# Patient Record
Sex: MALE | Race: WHITE | NOT HISPANIC OR LATINO
[De-identification: names, ages, dates, MRNs, and addresses within clinical notes are randomized per-mention and may not be internally consistent; named-entity substitution may affect disease eponyms.]

---
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
## 2019-02-21 ENCOUNTER — EMERGENCY (EMERGENCY)
Facility: HOSPITAL | Age: 19
LOS: 1 days | Discharge: HOME | End: 2019-02-21
Attending: EMERGENCY MEDICINE | Admitting: EMERGENCY MEDICINE
Payer: COMMERCIAL

## 2019-02-21 VITALS
HEART RATE: 94 BPM | SYSTOLIC BLOOD PRESSURE: 134 MMHG | TEMPERATURE: 98 F | RESPIRATION RATE: 18 BRPM | OXYGEN SATURATION: 100 % | DIASTOLIC BLOOD PRESSURE: 80 MMHG

## 2019-02-21 VITALS — WEIGHT: 145.06 LBS | HEIGHT: 74 IN

## 2019-02-21 DIAGNOSIS — N50.812 LEFT TESTICULAR PAIN: ICD-10-CM

## 2019-02-21 LAB
APPEARANCE UR: CLEAR — SIGNIFICANT CHANGE UP
BILIRUB UR-MCNC: NEGATIVE — SIGNIFICANT CHANGE UP
COLOR SPEC: YELLOW — SIGNIFICANT CHANGE UP
DIFF PNL FLD: NEGATIVE — SIGNIFICANT CHANGE UP
GLUCOSE UR QL: NEGATIVE MG/DL — SIGNIFICANT CHANGE UP
KETONES UR-MCNC: NEGATIVE — SIGNIFICANT CHANGE UP
LEUKOCYTE ESTERASE UR-ACNC: NEGATIVE — SIGNIFICANT CHANGE UP
NITRITE UR-MCNC: NEGATIVE — SIGNIFICANT CHANGE UP
PH UR: 6 — SIGNIFICANT CHANGE UP (ref 5–8)
PROT UR-MCNC: NEGATIVE MG/DL — SIGNIFICANT CHANGE UP
SP GR SPEC: 1.02 — SIGNIFICANT CHANGE UP (ref 1.01–1.02)
UROBILINOGEN FLD QL: NEGATIVE MG/DL — SIGNIFICANT CHANGE UP

## 2019-02-21 PROCEDURE — 76870 US EXAM SCROTUM: CPT | Mod: 26

## 2019-02-21 PROCEDURE — 99284 EMERGENCY DEPT VISIT MOD MDM: CPT

## 2019-02-21 RX ORDER — IBUPROFEN 200 MG
600 TABLET ORAL ONCE
Qty: 0 | Refills: 0 | Status: COMPLETED | OUTPATIENT
Start: 2019-02-21 | End: 2019-02-21

## 2019-02-21 RX ADMIN — Medication 600 MILLIGRAM(S): at 20:43

## 2019-02-21 NOTE — ED STATDOCS - ATTENDING CONTRIBUTION TO CARE
Attending Contribution to Care: I, Rosy Ramirez, performed the initial face to face bedside interview with this patient regarding history of present illness, review of symptoms and relevant past medical, social and family history.  I completed an independent physical examination.  I was the initial provider who evaluated this patient. I have signed out the follow up of any pending tests (i.e. labs, radiological studies) to the ACP.  I have communicated the patient’s plan of care and disposition with the ACP.

## 2019-02-21 NOTE — ED ADULT NURSE NOTE - NSIMPLEMENTINTERV_GEN_ALL_ED
Implemented All Universal Safety Interventions:  [place] to call system. Call bell, personal items and telephone within reach. Instruct patient to call for assistance. Room bathroom lighting operational. Non-slip footwear when patient is off stretcher. Physically safe environment: no spills, clutter or unnecessary equipment. Stretcher in lowest position, wheels locked, appropriate side rails in place.

## 2019-02-21 NOTE — ED STATDOCS - OBJECTIVE STATEMENT
19 y/o m presenting to the ED c/o left testicular pain/swelling since 5am yesterday. Pt states he masturbated at 5am yesterday morning, then urinated, pain began shortly after. Pain described as dull, aching. +reports occasional burning urination. Denies fever, chills, discharge, hematuria. No recent injury or fall. Last sexual intercourse was 2 months ago with STD negative individual. Took Aleve this morning with little relief to sx. No hx of similar sx in the past.

## 2019-02-21 NOTE — ED ADULT TRIAGE NOTE - CHIEF COMPLAINT QUOTE
lt testicular pain with swelling since 5am yesterday +burning  sensation  denies discharge and fever no inj

## 2019-02-21 NOTE — ED STATDOCS - PROGRESS NOTE DETAILS
Patient initially seen and evaluated with ED attending at intake.  Testicular exam performed with Dr. Ramirez at bedside was unremarkable, +cremasteric reflex.  Urine and sono negative here.  Cultures and gc/chlamydia were sent from [facility] that he will follow up with.  Recommended urology follow up -Aj Gallegos PA-C

## 2019-02-22 RX ORDER — DOCUSATE SODIUM 100 MG
1 CAPSULE ORAL
Qty: 0 | Refills: 0 | COMMUNITY

## 2019-02-23 ENCOUNTER — TRANSCRIPTION ENCOUNTER (OUTPATIENT)
Age: 19
End: 2019-02-23

## 2019-03-05 ENCOUNTER — APPOINTMENT (OUTPATIENT)
Dept: UROLOGY | Facility: CLINIC | Age: 19
End: 2019-03-05
Payer: COMMERCIAL

## 2019-03-05 VITALS
SYSTOLIC BLOOD PRESSURE: 122 MMHG | DIASTOLIC BLOOD PRESSURE: 85 MMHG | BODY MASS INDEX: 19.64 KG/M2 | WEIGHT: 145 LBS | TEMPERATURE: 97.6 F | HEIGHT: 72 IN | OXYGEN SATURATION: 98 % | HEART RATE: 64 BPM

## 2019-03-05 DIAGNOSIS — Z78.9 OTHER SPECIFIED HEALTH STATUS: ICD-10-CM

## 2019-03-05 PROCEDURE — 99204 OFFICE O/P NEW MOD 45 MIN: CPT

## 2019-03-05 NOTE — REVIEW OF SYSTEMS
[Eyesight Problems] : eyesight problems [Strain or push to urinate] : strain or push to urinate [Interrupted urine stream] : interrupted urine stream [see HPI] : see HPI [Anxiety] : anxiety [Depression] : depression [Negative] : Heme/Lymph

## 2019-03-07 NOTE — PHYSICAL EXAM
[General Appearance - Well Developed] : well developed [General Appearance - Well Nourished] : well nourished [Normal Appearance] : normal appearance [Well Groomed] : well groomed [General Appearance - In No Acute Distress] : no acute distress [Edema] : no peripheral edema [Respiration, Rhythm And Depth] : normal respiratory rhythm and effort [Exaggerated Use Of Accessory Muscles For Inspiration] : no accessory muscle use [Abdomen Soft] : soft [Abdomen Tenderness] : non-tender [Abdomen Hernia] : no hernia was discovered [Costovertebral Angle Tenderness] : no ~M costovertebral angle tenderness [Urethral Meatus] : meatus normal [Penis Abnormality] : normal circumcised penis [Scrotum] : the scrotum was normal [Epididymis] : the epididymides were normal [Testes Tenderness] : no tenderness of the testes [Testes Mass (___cm)] : there were no testicular masses [Normal Station and Gait] : the gait and station were normal for the patient's age [Skin Color & Pigmentation] : normal skin color and pigmentation [Skin Turgor] : supple [] : no rash [No Focal Deficits] : no focal deficits [Sensation] : the sensory exam was normal to light touch and pinprick [Oriented To Time, Place, And Person] : oriented to person, place, and time [Affect] : the affect was normal [Mood] : the mood was normal [Not Anxious] : not anxious [No Palpable Adenopathy] : no palpable adenopathy [Urinary Bladder Findings] : the bladder was normal on palpation

## 2019-03-07 NOTE — ASSESSMENT
[FreeTextEntry1] : 17 yo M with LEFT orchalgia. His left testicular pain has resolved at this time with NSAIDS. \par He will RTO PRN return of symptoms.

## 2019-03-07 NOTE — HISTORY OF PRESENT ILLNESS
[FreeTextEntry1] : Mr Ku is a 19 yo Male that presented 3/5/19 for evaluation of left testicular pain.  He was recently seen at [facility] where Scrotal US showed no evidence of torsion. He was discharged with diagnosis of epididymitis and had been taking ibuprofen 200 mg Q 4 hours for 4 days. His Pain began weeks ago, and was located in the left testicle and was a mild dull constant ache that was alleviated by NSAID use.   At this time he is pain free and has been for 4 days.  He is currently sexually active with 2 partners and uses condoms most of the time.  He has STI testing done which was negative when he was seen at [facility].

## 2019-04-11 ENCOUNTER — APPOINTMENT (OUTPATIENT)
Age: 19
End: 2019-04-11
Payer: MEDICAID

## 2019-04-11 DIAGNOSIS — N34.1 NONSPECIFIC URETHRITIS: ICD-10-CM

## 2019-04-11 PROCEDURE — 96372 THER/PROPH/DIAG INJ SC/IM: CPT

## 2019-04-11 PROCEDURE — 99213 OFFICE O/P EST LOW 20 MIN: CPT | Mod: 25

## 2019-04-11 NOTE — ASSESSMENT
[FreeTextEntry1] : 19 yo M with symptoms and exam findings concerning for infectious urethritis. 350 mg of Ceftriaxone administered IM. Rx given for Azithromycin 1000 mg x1 dose. Full STD panel ordered. Pt will follow up 1-2 weeks for symptom check.

## 2019-04-11 NOTE — HISTORY OF PRESENT ILLNESS
[FreeTextEntry1] : Mr Ku is a 19 yo Male that presented 3/5/19 for evaluation of left testicular pain.  He was recently seen at [facility] where he had a torsion ruled out and was diagnosed with epididymitis and had been taking ibuprofen 200 mg Q 4 hours for 4 days. His Pain was located in the left testicle and was a dull constant ache that was alleviated by NSAID use.   At this time he is pain free and has been for 4 days.  He is currently sexually active with 2 partners and uses condoms most of the time.  He has STI testing done which was negative when he was seen at [facility]. \par \par 04/11/2019: Patient presents for follow up. He reports no further scrotal pain, but now has burning with urination and urethral discharge. He reports recent unprotected sex. No hematuria, no frequency, no urgency, no hesitancy, no straining. No incontinence. No fevers, no chills, no nausea, no vomiting, no flank pain.

## 2019-04-11 NOTE — PHYSICAL EXAM
[General Appearance - Well Developed] : well developed [Normal Appearance] : normal appearance [General Appearance - Well Nourished] : well nourished [Well Groomed] : well groomed [General Appearance - In No Acute Distress] : no acute distress [Abdomen Soft] : soft [Abdomen Tenderness] : non-tender [Abdomen Hernia] : no hernia was discovered [Costovertebral Angle Tenderness] : no ~M costovertebral angle tenderness [Urethral Meatus] : meatus normal [Penis Abnormality] : normal circumcised penis [Urinary Bladder Findings] : the bladder was normal on palpation [Scrotum] : the scrotum was normal [Epididymis] : the epididymides were normal [Testes Tenderness] : no tenderness of the testes [Testes Mass (___cm)] : there were no testicular masses [FreeTextEntry1] : tender bulbar urethra [Skin Color & Pigmentation] : normal skin color and pigmentation [Skin Turgor] : supple [Edema] : no peripheral edema [Exaggerated Use Of Accessory Muscles For Inspiration] : no accessory muscle use [Respiration, Rhythm And Depth] : normal respiratory rhythm and effort [] : no respiratory distress [Affect] : the affect was normal [Oriented To Time, Place, And Person] : oriented to person, place, and time [Not Anxious] : not anxious [Mood] : the mood was normal [No Focal Deficits] : no focal deficits [Normal Station and Gait] : the gait and station were normal for the patient's age [No Palpable Adenopathy] : no palpable adenopathy [Sensation] : the sensory exam was normal to light touch and pinprick

## 2019-04-11 NOTE — REVIEW OF SYSTEMS
[Eyesight Problems] : eyesight problems [Strain or push to urinate] : strain or push to urinate [Interrupted urine stream] : interrupted urine stream [see HPI] : see HPI [Depression] : depression [Anxiety] : anxiety [Negative] : Endocrine

## 2019-04-12 LAB
APPEARANCE: CLEAR
BACTERIA: NEGATIVE
BILIRUBIN URINE: NEGATIVE
BLOOD URINE: NEGATIVE
COLOR: NORMAL
GLUCOSE QUALITATIVE U: NEGATIVE
HYALINE CASTS: 0 /LPF
KETONES URINE: NEGATIVE
LEUKOCYTE ESTERASE URINE: NEGATIVE
MICROSCOPIC-UA: NORMAL
NITRITE URINE: NEGATIVE
PH URINE: 6
PROTEIN URINE: NEGATIVE
RED BLOOD CELLS URINE: 2 /HPF
SPECIFIC GRAVITY URINE: 1.02
SQUAMOUS EPITHELIAL CELLS: 0 /HPF
UROBILINOGEN URINE: NORMAL
WHITE BLOOD CELLS URINE: 4 /HPF

## 2019-04-15 LAB
BACTERIA UR CULT: NORMAL
C TRACH RRNA SPEC QL NAA+PROBE: DETECTED
N GONORRHOEA RRNA SPEC QL NAA+PROBE: NOT DETECTED
SOURCE AMPLIFICATION: NORMAL

## 2019-04-25 ENCOUNTER — APPOINTMENT (OUTPATIENT)
Dept: UROLOGY | Facility: CLINIC | Age: 19
End: 2019-04-25

## 2019-04-30 ENCOUNTER — APPOINTMENT (OUTPATIENT)
Dept: UROLOGY | Facility: CLINIC | Age: 19
End: 2019-04-30
Payer: COMMERCIAL

## 2019-04-30 PROCEDURE — 99213 OFFICE O/P EST LOW 20 MIN: CPT

## 2019-04-30 RX ORDER — AZITHROMYCIN 500 MG/1
500 TABLET, FILM COATED ORAL
Qty: 2 | Refills: 0 | Status: COMPLETED | COMMUNITY
Start: 2019-04-11 | End: 2019-04-30

## 2019-04-30 NOTE — ASSESSMENT
[FreeTextEntry1] : 19 yo M with chlamydia urethritis. He was treated appropriately last visit with 350 mg of Ceftriaxone administered IM, Azithromycin 1000 mg x1 dose. Full STD panel still pending. Will repeat urine assay to confirm resolution given perisstence of mild urinary discomfort.

## 2019-04-30 NOTE — HISTORY OF PRESENT ILLNESS
[FreeTextEntry1] : Mr Ku is a 19 yo Male that presented 3/5/19 for evaluation of left testicular pain.  He was recently seen at [facility] where he had a torsion ruled out and was diagnosed with epididymitis and had been taking ibuprofen 200 mg Q 4 hours for 4 days. His Pain was located in the left testicle and was a dull constant ache that was alleviated by NSAID use.   At this time he is pain free and has been for 4 days.  He is currently sexually active with 2 partners and uses condoms most of the time.  He has STI testing done which was negative when he was seen at [facility]. \par \par 04/11/2019: Patient presents for follow up. He reports no further scrotal pain, but now has burning with urination and urethral discharge. He reports recent unprotected sex. No hematuria, no frequency, no urgency, no hesitancy, no straining. No incontinence. No fevers, no chills, no nausea, no vomiting, no flank pain. \par \par 04/30/2019: Patient presents for follow up. His GC/CT assay was positive for chlamydia. He was given Ceftriaxone IM and Azythromycin 1000 mg.  He reports dysuria has resolved. Still some mild discomfort with voiding, but much improved.  No hematuria, no frequency, no urgency, no hesitancy, no straining. No incontinence. No fevers, no chills, no nausea, no vomiting, no flank pain.

## 2019-04-30 NOTE — DISCUSSION/SUMMARY
[FreeTextEntry1] : Called patient to discuss the recent lab results: urine sample + chlamydia. Pt. verbalizes an understanding of the disease and states that he finished his antibiotics - azithromycin as ordered. Ceftriaxone 350mg IM x 1 given in office at time of appointment. Pt. scheduled to RTO next Thursday. Pt. instructed to follow practices of safe sex and to use condoms. Dr. Noguera made aware of the results today.

## 2019-04-30 NOTE — PHYSICAL EXAM
[General Appearance - Well Developed] : well developed [General Appearance - Well Nourished] : well nourished [Normal Appearance] : normal appearance [Well Groomed] : well groomed [General Appearance - In No Acute Distress] : no acute distress [Abdomen Soft] : soft [Abdomen Tenderness] : non-tender [Abdomen Hernia] : no hernia was discovered [Costovertebral Angle Tenderness] : no ~M costovertebral angle tenderness [Urethral Meatus] : meatus normal [Penis Abnormality] : normal circumcised penis [Urinary Bladder Findings] : the bladder was normal on palpation [Scrotum] : the scrotum was normal [Epididymis] : the epididymides were normal [Testes Tenderness] : no tenderness of the testes [Testes Mass (___cm)] : there were no testicular masses [Skin Color & Pigmentation] : normal skin color and pigmentation [Skin Turgor] : supple [Edema] : no peripheral edema [] : no respiratory distress [Respiration, Rhythm And Depth] : normal respiratory rhythm and effort [Exaggerated Use Of Accessory Muscles For Inspiration] : no accessory muscle use [Oriented To Time, Place, And Person] : oriented to person, place, and time [Affect] : the affect was normal [Mood] : the mood was normal [Not Anxious] : not anxious [Normal Station and Gait] : the gait and station were normal for the patient's age [No Focal Deficits] : no focal deficits [Sensation] : the sensory exam was normal to light touch and pinprick [No Palpable Adenopathy] : no palpable adenopathy [FreeTextEntry1] : tender bulbar urethra

## 2019-05-06 LAB
C TRACH RRNA SPEC QL NAA+PROBE: DETECTED
N GONORRHOEA RRNA SPEC QL NAA+PROBE: NOT DETECTED
SOURCE AMPLIFICATION: NORMAL

## 2019-05-19 ENCOUNTER — TRANSCRIPTION ENCOUNTER (OUTPATIENT)
Age: 19
End: 2019-05-19

## 2019-05-24 ENCOUNTER — APPOINTMENT (OUTPATIENT)
Dept: INTERNAL MEDICINE | Facility: CLINIC | Age: 19
End: 2019-05-24
Payer: COMMERCIAL

## 2019-05-24 VITALS
HEIGHT: 72 IN | SYSTOLIC BLOOD PRESSURE: 104 MMHG | WEIGHT: 145 LBS | BODY MASS INDEX: 19.64 KG/M2 | TEMPERATURE: 98.1 F | HEART RATE: 85 BPM | DIASTOLIC BLOOD PRESSURE: 72 MMHG | OXYGEN SATURATION: 98 % | RESPIRATION RATE: 18 BRPM

## 2019-05-24 DIAGNOSIS — B27.90 INFECTIOUS MONONUCLEOSIS, UNSPECIFIED W/OUT COMPLICATION: ICD-10-CM

## 2019-05-24 PROCEDURE — 99204 OFFICE O/P NEW MOD 45 MIN: CPT

## 2019-05-24 NOTE — HEALTH RISK ASSESSMENT
[Employed] : employed [Smoke Detector] : smoke detector [Carbon Monoxide Detector] : carbon monoxide detector [Safety elements used in home] : safety elements used in home [Seat Belt] :  uses seat belt [Sunscreen] : uses sunscreen [0] : 2) Feeling down, depressed, or hopeless: Not at all (0) [] : No [de-identified] : marijuana [[id number]] : 0 [Guns at Home] : no guns at home [FreeTextEntry2] : palak smith

## 2019-05-24 NOTE — HISTORY OF PRESENT ILLNESS
[FreeTextEntry1] : Patient comes in to establish care.\par  [de-identified] : Patient is a 17 y/o M comes in to [facility] care. Was recently seen on 5/19 at Urgent care with complaints of sore throat, with monospot test positive. Today feeling much better and states he needs a return to work date/letter. \par He was recently seen by Urology and diagnosed with chlamydia, treated with rocephin 250mg IM x1 and azithromycin 1000 mg x1. Test of cure results are positive, pt was not aware of these results. Per patient no new sexual partners since last treatment. \par Patient denies any cp, sob,abdominal pain, nausea, vomiting, palpitations, fever, chills, constipation, diarrhea.\par

## 2019-05-30 NOTE — H&P ADULT - NSHPPHYSICALEXAM_GEN_ALL_CORE
Visit Note Report:  Piyush is a pleasant 62-year-old male with multiple medical comorbidities including a history of chronic kidney disease with kidney transplant. He is here today with complaints of bilateral shoulder pain. The right shoulder pain is nearly completely resolved. He continues to have some left shoulder pain that has also improved over the past month. A month ago he was a golf clubs and had immediate onset of pain in the lateral aspect of his left shoulder in the region of the deltoid where he points to locate his symptoms. He has been avoiding golf and has been using ice and heat and working on exercises and states that he has experienced substantial improvement in his symptoms since the initial injury but he wanted to make sure that there was not any need for imaging studies or a problem that needed to be seen and addressed sooner rather than later. He is able to reach his left arm overhead. He has not gone back to golfing at this point but wonders what would be an appropriate time to return to golfing.    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 Murillo   • Iridotomy/iridectomy by laser Bilateral 5-    Laser, Iridotomy   • Iridotomy/iridectomy by laser Bilateral 5-22-15    Laser, Iridotomy-repeat   • Removal of hydrocele,tunica,bilat      R. side   • Remv foreign body,knee/thigh,deep      foreign body from medical misadventure-removed from right leg   • Transplant kidney+recip nephrec  1986    [place]     Social History     Socioeconomic History   • Marital status: /Civil Union     Spouse name: Cindi   • Number of children: 0   • Years of education: Not on file   • Highest education level: Not on file   Occupational History     Employer: [employer]   Social Needs   • Financial resource strain: Not on file   • Food insecurity:     Worry: Not on file     Inability: Not on file   • Transportation needs:     Medical: Not on file     Non-medical: Not on file   Tobacco Use   • Smoking status: Former Smoker     Types: Cigars     Last attempt to quit: 2010     Years since quittin.4   • Smokeless tobacco: Never Used   Substance and Sexual Activity   • Alcohol use: Yes     Alcohol/week: 0.0 oz     Comment: Occ   • Drug use: No   • Sexual activity: Yes     Partners: Female   Lifestyle   • Physical activity:     Days per week: Not on file     Minutes per session: Not on file   • Stress: Not on file   Relationships   • Social connections:     Talks on phone: Not on file     Gets together: Not on file     Attends Orthodox service: Not on file     Active member of club or organization: Not on file     Attends meetings of clubs or organizations: Not on file     Relationship status: Not on file   • Intimate partner violence:     Fear of current or ex partner: Not on  file     Emotionally abused: Not on file     Physically abused: Not on file     Forced sexual activity: Not on file   Other Topics Concern   • Not on file   Social History Narrative    HUAN, low cholesterol diet     Family History   Problem Relation Age of Onset   • Cancer Mother         lung   • Cancer Father         mesothelioma     ALLERGIES:   Allergen Reactions   • Penicillins RASH     Current Outpatient Medications   Medication Sig Dispense Refill   • lisinopril (ZESTRIL) 20 MG tablet TAKE ONE TABLET BY MOUTH ONE TIME DAILY 30 tablet 11   • pravastatin (PRAVACHOL) 20 MG tablet TAKE ONE TABLET BY MOUTH NIGHTLY AT BEDTIME 90 tablet 3   • FLUZONE QUADRIVALENT 0.5 ML injection TO BE ADMINISTERED BY PHARMACIST FOR IMMUNIZATION  0   • REVLIMID 10 MG capsule TAKE ONE CAPSULE BY MOUTH EVERY OTHER DAY FOR 21 DAYS THEN 7 DAYS OFF  0   • linezolid (ZYVOX) 600 MG tablet Take 600 mg by mouth.     • ranitidine (ZANTAC) 150 MG tablet Take 150 mg by mouth.     • vitamin - therapeutic multivitamins w/minerals (CENTRUM SILVER,THERA-M) Tab Take 1 tablet by mouth daily.     • aspirin 81 MG tablet Take 81 mg by mouth 2 times daily.     • atenolol (TENORMIN) 100 MG tablet Take  by mouth.     • PREDNISONE PO Take 7.5 mg by mouth daily. Indications: Kidney Transplant     • albuterol 108 (90 BASE) MCG/ACT inhaler Inhale 2 puffs into the lungs 2 times daily. And as needed for wheeze or shortness of breath 1 Inhaler 5   • CycloSPORINE Modified 100 MG OR CAPS TAKE ONE CAPSULE BY MOUTH ONE TIME DAILY FOR KIDNEY TRANSPLANT 30 2   • Mycophenolate Mofetil (CELLCEPT) 250 MG OR CAPS 3 bid for kidney transplant  V42.0 540 1     No current facility-administered medications for this visit.        I have reviewed the patient's medications and allergies, past medical, surgical, social and family history, updating these as appropriate.  See Histories section of the EMR for a display of this information.    PHYSICAL EXAM:  Bilateral upper extremities  were examined for bilateral comparison and pertinent positives are listed below.  General: Patient is awake, alert and in no acute distress  Psychiatric:  Speech and behavior are appropriate.  Inspection: Inspection demonstrates no erythema, rashes, open wounds or evidence of underlying infection. No obvious muscle atrophy..  Palpation: Minimal tenderness to palpation lateral left shoulder  ROM: Patient is able actively elevate the left shoulder 180°, external rotation is 70° internal rotation 50°  Strength Testing: Patient exhibits 5/5 bilateral shoulder internal and external rotation abduction, and flexion strength bilaterally symmetrical with pain reproduced with resisted external rotation on the left  Vascular: 2+ distal pulses  Neuro: Sensation is intact to light touch bilateral lower extremities  Special Tests: Empty can test is negative for weakness or pain reproduction on the left    Assessment:  Resolving left shoulder rotator cuff strain    Plan:  I reassured the patient that based on the strength that he is able to exhibit on his physical exam today I think it very unlikely that he has a torn rotator cuff and that his symptoms are likely related to a resolving left shoulder rotator cuff strain. I encouraged him to continue to do exactly what he has been doing up to this point with activity modification, ice and/or heat, and exercises and may return to golf as long his golf does not seem to aggravate his symptoms and should do so at a slow pace. The patient was in agreement with this plan will follow-up with me if his symptoms worsen.     ICU Vital Signs Last 24 Hrs  T(C): 37 (08 Feb 2018 11:12), Max: 37 (08 Feb 2018 11:12)  T(F): 98.6 (08 Feb 2018 11:12), Max: 98.6 (08 Feb 2018 11:12)  HR: 59 (08 Feb 2018 11:12) (59 - 62)  BP: 120/69 (08 Feb 2018 11:12) (118/64 - 120/69)  BP(mean): --  ABP: --  ABP(mean): --  RR: 18 (08 Feb 2018 11:12) (16 - 18)  SpO2: 100% (08 Feb 2018 11:12) (100% - 100%)    PHYSICAL EXAM:  Constitutional: NAD  Eyes:  WNL  ENMT:  WNL  Neck:  WNL, non tender  Back: Non tender  Respiratory: CTABL  Cardiovascular:  S1+S2+0  Gastrointestinal: Soft, ND, RLQ tenderness, rebound and guarding.  Genitourinary:  WNL  Extremities: NV intact  Vascular:  Intact  Neurological: No focal neurological deficit,  CN, motor and sensory system grossly intact.  Skin: WNL  Musculoskeletal: WNL  Psychiatric: Grossly WNL

## 2019-06-26 ENCOUNTER — APPOINTMENT (OUTPATIENT)
Dept: INTERNAL MEDICINE | Facility: CLINIC | Age: 19
End: 2019-06-26
Payer: COMMERCIAL

## 2019-06-26 VITALS
TEMPERATURE: 97.5 F | WEIGHT: 145 LBS | OXYGEN SATURATION: 97 % | DIASTOLIC BLOOD PRESSURE: 78 MMHG | HEART RATE: 64 BPM | SYSTOLIC BLOOD PRESSURE: 104 MMHG | BODY MASS INDEX: 19.64 KG/M2 | HEIGHT: 72 IN | RESPIRATION RATE: 18 BRPM

## 2019-06-26 DIAGNOSIS — A56.2 CHLAMYDIAL INFECTION OF GENITOURINARY TRACT, UNSPECIFIED: ICD-10-CM

## 2019-06-26 PROCEDURE — 99213 OFFICE O/P EST LOW 20 MIN: CPT

## 2019-06-26 NOTE — HISTORY OF PRESENT ILLNESS
[FreeTextEntry1] : SHAYNE DONTE is a 18 year M who comes in for a follow up visit.\par  [de-identified] : Patient is a 18-year-old male history of infectious mononucleosis and chlamydia. Patient states that he did take his doxycycline however he went on vacation and forgot his medication at home. He had 3 doses of his doxycycline left. He took the remaining dosages once he returned from vacation. He is still having symptoms including spontaneous pain at the penis, and incomplete bladder emptying sensation. He has had some on and off dysuria as well. No penile discharge, rash or lesions. He is sexually active and is using protection 100% of the time.\par Patient denies any cp, sob,abdominal pain, nausea, vomiting, palpitations, fever, chills, constipation, diarrhea.\par

## 2019-06-26 NOTE — ASSESSMENT
[FreeTextEntry1] : 1.Chlamydia trachomatis infection: pt retreated with doxycycline, however, he skipped 4-5 days in between as he went on vacation and completed course once he returned. Still with some vague symptoms. Will retest urine for GC/C. Patient counseled on safe sex practices.

## 2019-06-27 LAB
C TRACH RRNA SPEC QL NAA+PROBE: NOT DETECTED
N GONORRHOEA RRNA SPEC QL NAA+PROBE: NOT DETECTED
SOURCE AMPLIFICATION: NORMAL

## 2019-07-22 ENCOUNTER — APPOINTMENT (OUTPATIENT)
Dept: PSYCHIATRY | Facility: CLINIC | Age: 19
End: 2019-07-22
Payer: COMMERCIAL

## 2019-07-22 PROCEDURE — 99205 OFFICE O/P NEW HI 60 MIN: CPT

## 2019-07-22 RX ORDER — DOXYCYCLINE 100 MG/1
100 CAPSULE ORAL
Qty: 14 | Refills: 0 | Status: DISCONTINUED | COMMUNITY
Start: 2019-05-24 | End: 2019-07-22

## 2019-08-14 ENCOUNTER — APPOINTMENT (OUTPATIENT)
Dept: PSYCHIATRY | Facility: CLINIC | Age: 19
End: 2019-08-14
Payer: COMMERCIAL

## 2019-08-14 PROCEDURE — 99214 OFFICE O/P EST MOD 30 MIN: CPT

## 2019-08-30 ENCOUNTER — TRANSCRIPTION ENCOUNTER (OUTPATIENT)
Age: 19
End: 2019-08-30

## 2019-09-09 ENCOUNTER — APPOINTMENT (OUTPATIENT)
Dept: PSYCHIATRY | Facility: CLINIC | Age: 19
End: 2019-09-09
Payer: COMMERCIAL

## 2019-09-09 PROCEDURE — 99214 OFFICE O/P EST MOD 30 MIN: CPT

## 2019-09-09 RX ORDER — CITALOPRAM HYDROBROMIDE 40 MG/1
40 TABLET, FILM COATED ORAL DAILY
Qty: 30 | Refills: 0 | Status: DISCONTINUED | COMMUNITY
Start: 2019-07-22 | End: 2019-09-09

## 2019-10-07 ENCOUNTER — APPOINTMENT (OUTPATIENT)
Dept: PSYCHIATRY | Facility: CLINIC | Age: 19
End: 2019-10-07
Payer: COMMERCIAL

## 2019-10-07 PROCEDURE — 99214 OFFICE O/P EST MOD 30 MIN: CPT

## 2020-01-06 ENCOUNTER — APPOINTMENT (OUTPATIENT)
Dept: PSYCHIATRY | Facility: CLINIC | Age: 20
End: 2020-01-06
Payer: COMMERCIAL

## 2020-01-06 PROCEDURE — 99214 OFFICE O/P EST MOD 30 MIN: CPT

## 2020-01-14 ENCOUNTER — APPOINTMENT (OUTPATIENT)
Dept: UROLOGY | Facility: CLINIC | Age: 20
End: 2020-01-14
Payer: COMMERCIAL

## 2020-01-14 DIAGNOSIS — N50.812 LEFT TESTICULAR PAIN: ICD-10-CM

## 2020-01-14 PROCEDURE — 99213 OFFICE O/P EST LOW 20 MIN: CPT

## 2020-01-25 PROBLEM — N50.812 LEFT TESTICULAR PAIN: Status: ACTIVE | Noted: 2019-03-05

## 2020-01-25 NOTE — REVIEW OF SYSTEMS
[Eyesight Problems] : eyesight problems [Strain or push to urinate] : strain or push to urinate [Interrupted urine stream] : interrupted urine stream [see HPI] : see HPI [Anxiety] : anxiety [Depression] : depression [Negative] : Neurological

## 2020-01-25 NOTE — HISTORY OF PRESENT ILLNESS
[FreeTextEntry1] : Mr Ku is a 19 yo Male that presented 3/5/19 for evaluation of left testicular pain.  He was recently seen at [facility] where he had a torsion ruled out and was diagnosed with epididymitis and had been taking ibuprofen 200 mg Q 4 hours for 4 days. His Pain was located in the left testicle and was a dull constant ache that was alleviated by NSAID use.   At this time he is pain free and has been for 4 days.  He is currently sexually active with 2 partners and uses condoms most of the time.  He has STI testing done which was negative when he was seen at [facility]. \par \par 04/11/2019: Patient presents for follow up. He reports no further scrotal pain, but now has burning with urination and urethral discharge. He reports recent unprotected sex. No hematuria, no frequency, no urgency, no hesitancy, no straining. No incontinence. No fevers, no chills, no nausea, no vomiting, no flank pain. \par \par 04/30/2019: Patient presents for follow up. His GC/CT assay was positive for chlamydia. He was given Ceftriaxone IM and Azythromycin 1000 mg.  He reports dysuria has resolved. Still some mild discomfort with voiding, but much improved.  No hematuria, no frequency, no urgency, no hesitancy, no straining. No incontinence. No fevers, no chills, no nausea, no vomiting, no flank pain. \par \par 01/14/2020: Patient presents for follow up. He reports mild LEFT scrotal pain. No dysuria or other new  symptoms. No hematuria, no dysuria, no frequency, no urgency, no hesitancy, no straining. No incontinence. No fevers, no chills, no nausea, no vomiting, no flank pain.

## 2020-01-25 NOTE — ASSESSMENT
[FreeTextEntry1] : 17 yo M with h/o chlamydia urethritis, now with mild epididymitis. Discussed that this may represent recurrence of chlamydia, but pt states he has not had risky sexual encounters since last visit. Recommend NSAIDs. If sx worsens, will repeat STD panel.

## 2020-01-25 NOTE — PHYSICAL EXAM
[General Appearance - Well Developed] : well developed [General Appearance - Well Nourished] : well nourished [Normal Appearance] : normal appearance [Well Groomed] : well groomed [General Appearance - In No Acute Distress] : no acute distress [Abdomen Hernia] : no hernia was discovered [Abdomen Tenderness] : non-tender [Abdomen Soft] : soft [Urethral Meatus] : meatus normal [Costovertebral Angle Tenderness] : no ~M costovertebral angle tenderness [Urinary Bladder Findings] : the bladder was normal on palpation [Penis Abnormality] : normal circumcised penis [Scrotum] : the scrotum was normal [Testes Mass (___cm)] : there were no testicular masses [Epididymis] : the epididymides were normal [FreeTextEntry1] : no urethral tenderness. Mild tenderness at LEFT epididymis [Skin Turgor] : supple [Skin Color & Pigmentation] : normal skin color and pigmentation [Edema] : no peripheral edema [Exaggerated Use Of Accessory Muscles For Inspiration] : no accessory muscle use [] : no respiratory distress [Respiration, Rhythm And Depth] : normal respiratory rhythm and effort [Not Anxious] : not anxious [Oriented To Time, Place, And Person] : oriented to person, place, and time [Mood] : the mood was normal [Affect] : the affect was normal [Normal Station and Gait] : the gait and station were normal for the patient's age [No Focal Deficits] : no focal deficits [Sensation] : the sensory exam was normal to light touch and pinprick [No Palpable Adenopathy] : no palpable adenopathy

## 2020-04-14 ENCOUNTER — TRANSCRIPTION ENCOUNTER (OUTPATIENT)
Age: 20
End: 2020-04-14

## 2020-06-30 ENCOUNTER — APPOINTMENT (OUTPATIENT)
Dept: UROLOGY | Facility: CLINIC | Age: 20
End: 2020-06-30
Payer: COMMERCIAL

## 2020-06-30 VITALS — TEMPERATURE: 98.7 F

## 2020-06-30 PROCEDURE — 99213 OFFICE O/P EST LOW 20 MIN: CPT

## 2020-06-30 NOTE — ASSESSMENT
[FreeTextEntry1] : 18 yo M with h/o chlamydia urethritis, epididymitis now with scrotal pruritus after being exposed to heat at work, pt denies risky sexual encounters since last visit. Physical exam with no concern for infection.                                 - Recommended pt to use skin sanitizing wipes or powder to prevent excessive diaphoresis \par - If Pruritus persists f/u with a dermatologist

## 2020-06-30 NOTE — REVIEW OF SYSTEMS
[Eyesight Problems] : eyesight problems [Strain or push to urinate] : strain or push to urinate [see HPI] : see HPI [Interrupted urine stream] : interrupted urine stream [Depression] : depression [Anxiety] : anxiety [Negative] : Heme/Lymph

## 2020-06-30 NOTE — HISTORY OF PRESENT ILLNESS
[FreeTextEntry1] : Mr Ku is a 19 yo Male that presented 3/5/19 for evaluation of left testicular pain.  He was recently seen at [facility] where he had a torsion ruled out and was diagnosed with epididymitis and had been taking ibuprofen 200 mg Q 4 hours for 4 days. His Pain was located in the left testicle and was a dull constant ache that was alleviated by NSAID use.   At this time he is pain free and has been for 4 days.  He is currently sexually active with 2 partners and uses condoms most of the time.  He has STI testing done which was negative when he was seen at [facility]. \par \par 04/11/2019: Patient presents for follow up. He reports no further scrotal pain, but now has burning with urination and urethral discharge. He reports recent unprotected sex. No hematuria, no frequency, no urgency, no hesitancy, no straining. No incontinence. No fevers, no chills, no nausea, no vomiting, no flank pain. \par \par 04/30/2019: Patient presents for follow up. His GC/CT assay was positive for chlamydia. He was given Ceftriaxone IM and Azythromycin 1000 mg.  He reports dysuria has resolved. Still some mild discomfort with voiding, but much improved.  No hematuria, no frequency, no urgency, no hesitancy, no straining. No incontinence. No fevers, no chills, no nausea, no vomiting, no flank pain. \par \par 01/14/2020: Patient presents for follow up. He reports mild LEFT scrotal pain. No dysuria or other new  symptoms. No hematuria, no dysuria, no frequency, no urgency, no hesitancy, no straining. No incontinence. No fevers, no chills, no nausea, no vomiting, no flank pain. \par \par 06/30/2020: Patient presents with a complaint of scrotal pruritus. 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 He denies any sexual encounters over the last 8 months and does not have concerns for any STI/STDs. He denies any penile discharge, lesions. No hematuria, no dysuria, no frequency, no urgency, no hesitancy, no straining. No incontinence. No fevers, no chills, no nausea, no vomiting, no flank pain.\par

## 2020-06-30 NOTE — PHYSICAL EXAM
[Normal Appearance] : normal appearance [General Appearance - Well Developed] : well developed [General Appearance - Well Nourished] : well nourished [General Appearance - In No Acute Distress] : no acute distress [Well Groomed] : well groomed [Abdomen Soft] : soft [Abdomen Tenderness] : non-tender [Abdomen Hernia] : no hernia was discovered [Costovertebral Angle Tenderness] : no ~M costovertebral angle tenderness [Urethral Meatus] : meatus normal [Penis Abnormality] : normal circumcised penis [Urinary Bladder Findings] : the bladder was normal on palpation [Epididymis] : the epididymides were normal [Scrotum] : the scrotum was normal [Testes Tenderness] : no tenderness of the testes [Testes Mass (___cm)] : there were no testicular masses [Skin Color & Pigmentation] : normal skin color and pigmentation [Skin Turgor] : supple [Skin Lesions] : no skin lesions [Edema] : no peripheral edema [] : no respiratory distress [Respiration, Rhythm And Depth] : normal respiratory rhythm and effort [Exaggerated Use Of Accessory Muscles For Inspiration] : no accessory muscle use [Oriented To Time, Place, And Person] : oriented to person, place, and time [Affect] : the affect was normal [Mood] : the mood was normal [Not Anxious] : not anxious [Normal Station and Gait] : the gait and station were normal for the patient's age [No Focal Deficits] : no focal deficits [Sensation] : the sensory exam was normal to light touch and pinprick [No Palpable Adenopathy] : no palpable adenopathy [FreeTextEntry1] : no urethral tenderness, no scrotal erythema, discharge, swelling or fluctuance

## 2020-10-13 ENCOUNTER — APPOINTMENT (OUTPATIENT)
Dept: PSYCHIATRY | Facility: CLINIC | Age: 20
End: 2020-10-13
Payer: COMMERCIAL

## 2020-10-13 PROCEDURE — 99214 OFFICE O/P EST MOD 30 MIN: CPT

## 2020-12-08 ENCOUNTER — APPOINTMENT (OUTPATIENT)
Dept: INTERNAL MEDICINE | Facility: CLINIC | Age: 20
End: 2020-12-08

## 2020-12-08 ENCOUNTER — APPOINTMENT (OUTPATIENT)
Dept: PSYCHIATRY | Facility: CLINIC | Age: 20
End: 2020-12-08

## 2020-12-09 ENCOUNTER — APPOINTMENT (OUTPATIENT)
Dept: PSYCHIATRY | Facility: CLINIC | Age: 20
End: 2020-12-09
Payer: COMMERCIAL

## 2020-12-09 PROCEDURE — 99214 OFFICE O/P EST MOD 30 MIN: CPT | Mod: 95

## 2020-12-10 ENCOUNTER — APPOINTMENT (OUTPATIENT)
Dept: ORTHOPEDIC SURGERY | Facility: CLINIC | Age: 20
End: 2020-12-10

## 2020-12-15 ENCOUNTER — OUTPATIENT (OUTPATIENT)
Dept: OUTPATIENT SERVICES | Facility: HOSPITAL | Age: 20
LOS: 1 days | End: 2020-12-15
Payer: COMMERCIAL

## 2020-12-15 DIAGNOSIS — Z20.828 CONTACT WITH AND (SUSPECTED) EXPOSURE TO OTHER VIRAL COMMUNICABLE DISEASES: ICD-10-CM

## 2020-12-15 LAB — SARS-COV-2 RNA SPEC QL NAA+PROBE: SIGNIFICANT CHANGE UP

## 2020-12-15 PROCEDURE — U0003: CPT

## 2020-12-21 DIAGNOSIS — Z20.828 CONTACT WITH AND (SUSPECTED) EXPOSURE TO OTHER VIRAL COMMUNICABLE DISEASES: ICD-10-CM

## 2021-02-25 NOTE — ED PROVIDER NOTE - ATTENDING CONTRIBUTION TO CARE
There are no Wet Read(s) to document. I, Salome Villarreal MD, personally saw the patient with the resident, and completed the key components of the history and physical exam. I then discussed the management plan with the resident.

## 2021-03-30 ENCOUNTER — APPOINTMENT (OUTPATIENT)
Dept: INTERNAL MEDICINE | Facility: CLINIC | Age: 21
End: 2021-03-30
Payer: COMMERCIAL

## 2021-03-30 VITALS
TEMPERATURE: 97.7 F | HEIGHT: 72 IN | HEART RATE: 69 BPM | BODY MASS INDEX: 23.3 KG/M2 | DIASTOLIC BLOOD PRESSURE: 74 MMHG | RESPIRATION RATE: 18 BRPM | WEIGHT: 172 LBS | SYSTOLIC BLOOD PRESSURE: 108 MMHG | OXYGEN SATURATION: 97 %

## 2021-03-30 DIAGNOSIS — M25.562 PAIN IN LEFT KNEE: ICD-10-CM

## 2021-03-30 PROCEDURE — 99072 ADDL SUPL MATRL&STAF TM PHE: CPT

## 2021-03-30 PROCEDURE — 99214 OFFICE O/P EST MOD 30 MIN: CPT

## 2021-03-30 NOTE — HISTORY OF PRESENT ILLNESS
[FreeTextEntry8] : Mr. SHAYNE KENT is a 20 year M who comes in for an acute visit.\par He has c/o itching sensation in his ears for about 3 months. Last yr he went to  and had ear irrigation and used debrox otic drops. 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 He saw ortho through  and had MRI knee and was to have PT but they changed their name. He would like to see new ortho at this time.\par Patient notes having covid 19 last year as well as his brothers and father and recovering well at home.

## 2021-03-30 NOTE — ASSESSMENT
[FreeTextEntry1] : 1.Cerumen Impaction: Discussed starting on Debrox otic drops and followed up with ENT for irrigation. Advised against the use of Q-tips.\par \par 2.left knee dislocation: Per patient he is seen previous worker's composition orthopedics and had MRI of the left knee. He would like to see\par Orthopedic for physical therapy referral.\par \par \par \par

## 2021-04-12 ENCOUNTER — APPOINTMENT (OUTPATIENT)
Dept: ORTHOPEDIC SURGERY | Facility: CLINIC | Age: 21
End: 2021-04-12
Payer: OTHER MISCELLANEOUS

## 2021-04-12 VITALS
HEART RATE: 62 BPM | SYSTOLIC BLOOD PRESSURE: 124 MMHG | HEIGHT: 72 IN | WEIGHT: 172 LBS | DIASTOLIC BLOOD PRESSURE: 70 MMHG | BODY MASS INDEX: 23.3 KG/M2

## 2021-04-12 PROCEDURE — 99203 OFFICE O/P NEW LOW 30 MIN: CPT

## 2021-04-12 PROCEDURE — 99072 ADDL SUPL MATRL&STAF TM PHE: CPT

## 2021-04-12 PROCEDURE — 73560 X-RAY EXAM OF KNEE 1 OR 2: CPT | Mod: LT

## 2021-04-13 ENCOUNTER — APPOINTMENT (OUTPATIENT)
Dept: OTOLARYNGOLOGY | Facility: CLINIC | Age: 21
End: 2021-04-13
Payer: COMMERCIAL

## 2021-04-13 ENCOUNTER — NON-APPOINTMENT (OUTPATIENT)
Age: 21
End: 2021-04-13

## 2021-04-13 VITALS — BODY MASS INDEX: 23.8 KG/M2 | HEIGHT: 71 IN | WEIGHT: 170 LBS | TEMPERATURE: 98.2 F

## 2021-04-13 DIAGNOSIS — H61.20 IMPACTED CERUMEN, UNSPECIFIED EAR: ICD-10-CM

## 2021-04-13 DIAGNOSIS — H92.02 OTALGIA, LEFT EAR: ICD-10-CM

## 2021-04-13 DIAGNOSIS — H92.01 OTALGIA, RIGHT EAR: ICD-10-CM

## 2021-04-13 PROCEDURE — 99243 OFF/OP CNSLTJ NEW/EST LOW 30: CPT

## 2021-04-13 PROCEDURE — 99072 ADDL SUPL MATRL&STAF TM PHE: CPT

## 2021-04-13 NOTE — HISTORY OF PRESENT ILLNESS
[10  (complete relief)] : the relief from treatment is 10/10 (complete relief) [3] : the ailment interference is 3/10 [10  (interferes completely)] : the ailment interference is10/10 (interferes completely) [de-identified] : The patient comes in today with complaints of pain in his left knee status post a work-related injury.  While working in a [employer], he reported he "dislocated his kneecap".  He was seen at another orthopedic office.  He had an MRI and a brace and he states he never followed up with him.  He comes in today, still having some complaints of pain.  He did not bring his MRI. The patient states the onset/injury occurred on 12/7/20. The patient states the pain is intermittent and localized.  The patient describes the pain as throbbing.  The patient notes wearing the brace makes his symptoms better, while not wearing the brace makes his symptoms worse.  The patient indicates a pain level of 0 on a pain scale of 0-10.  [] : No [de-identified] : Brace

## 2021-04-13 NOTE — REASON FOR VISIT
[Initial Visit] : an initial visit for [FreeTextEntry2] : his left knee.  This visit is related to Workers' Compensation

## 2021-04-13 NOTE — CONSULT LETTER
[Dear  ___] : Dear  [unfilled], [FreeTextEntry1] : I had the pleasure of evaluating your patient, Britton Ku.\par \par Thank you very much for allowing me to participate in the care of this patient.\par Please see my note below.\par \par If you have any questions, please do not hesitate to contact me.\par \par Sincerely,\par \par \par \par Everton Cooper III, MD\par SKYLER/sg

## 2021-04-13 NOTE — ADDENDUM
[FreeTextEntry1] : This note was written by Antionette Lopez on 04/13/2021 acting as a scribe for ANSON GIRALDO III, MD

## 2021-04-13 NOTE — CONSULT LETTER
[Consult Letter:] : I had the pleasure of evaluating your patient, [unfilled]. [Please see my note below.] : Please see my note below. [Consult Closing:] : Thank you very much for allowing me to participate in the care of this patient.  If you have any questions, please do not hesitate to contact me. [Sincerely,] : Sincerely, [FreeTextEntry1] : Dear Dr. ERICA LOBO,\par \par Thank you for your kind referral. Please refer to my enclosed office notes for SHAYNE KENT . If there are any questions free to contact me.\par  [FreeTextEntry3] : Cornell Chang MD, FACS\par

## 2021-04-13 NOTE — PHYSICAL EXAM
[de-identified] : Right Knee: \par Range of Motion in Degrees	\par 	                  Claimant:	Normal:	\par Flexion Active	  135 	                135-degrees	\par Flexion Passive	  135	                135-degrees	\par Extension Active	  0-5	                0-5-degrees	\par Extension Passive	  0-5	                0-5-degrees	\par \par No weakness to flexion/extension.  No evidence of instability in the AP plane or varus or valgus stress.  Negative  Lachman.  Negative pivot shift.  Negative anterior drawer test.  Negative posterior drawer test.  Negative Neto.  Negative Apley grind.  No medial or lateral joint line tenderness.  No tenderness over the medial and lateral facet of the patella.  No patellofemoral crepitations.  No lateral tilting patella.  No patellar apprehension.  No crepitation in the medial and lateral femoral condyle.  No proximal or distal swelling, edema or tenderness.  No gross motor or sensory deficits.  No intra-articular swelling.  2+ DP and PT pulses. No varus or valgus malalignment.  Skin is intact.  No rashes, scars or lesions.  \par \par Left Knee: \par Range of Motion in Degrees	\par 	                  Claimant:	Normal:	\par Flexion Active	  135 	                135-degrees	\par Flexion Passive	  135	                135-degrees	\par Extension Active	  0-5	                0-5-degrees	\par Extension Passive	  0-5	                0-5-degrees	\par \par No weakness to flexion/extension.  Positive apprehension.  No evidence of instability in the AP plane or varus or valgus stress.  Negative  Lachman.  Negative pivot shift.  Negative anterior drawer test.  Negative posterior drawer test.  Negative Neto.  Negative Apley grind.  No medial or lateral joint line tenderness.  No tenderness over the medial or lateral facet of the patella.  No patellofemoral crepitations.  No lateral tilting of patella.  No crepitation in the medial and lateral femoral condyle.  No proximal or distal swelling, edema or tenderness.  No gross neurological or vascular deficits distally.  Mild effusion.  2+ DP and PT pulses. No varus or valgus malalignment.  Skin is intact.  No rashes, scars or lesions.\par   [de-identified] : Ambulating with a slightly antalgic to antalgic gait.  Station:  Normal.  [de-identified] : Appearance:  Well-developed, well-nourished male in no acute distress.\par   [de-identified] : Radiographs, two views of the left knee, show no obvious osseous abnormalities.

## 2021-04-13 NOTE — DISCUSSION/SUMMARY
[de-identified] : At this time, the patient has a history of patellar dislocation. I recommended he start on a course of therapy, use of his patellar stabilization brace and I recommended he obtain the MRI results for our review.\par \par THIS IS A FORMAL REQUEST FOR AUTHORIZATION FOR PHYSICAL THERAPY FOR THE LEFT KNEE.\par \par \par \par

## 2021-04-20 ENCOUNTER — NON-APPOINTMENT (OUTPATIENT)
Age: 21
End: 2021-04-20

## 2021-04-20 ENCOUNTER — TRANSCRIPTION ENCOUNTER (OUTPATIENT)
Age: 21
End: 2021-04-20

## 2021-04-20 ENCOUNTER — APPOINTMENT (OUTPATIENT)
Dept: DERMATOLOGY | Facility: CLINIC | Age: 21
End: 2021-04-20
Payer: COMMERCIAL

## 2021-04-20 DIAGNOSIS — B36.0 PITYRIASIS VERSICOLOR: ICD-10-CM

## 2021-04-20 DIAGNOSIS — L29.1 PRURITUS SCROTI: ICD-10-CM

## 2021-04-20 PROCEDURE — 99204 OFFICE O/P NEW MOD 45 MIN: CPT

## 2021-04-20 PROCEDURE — 99072 ADDL SUPL MATRL&STAF TM PHE: CPT

## 2021-04-20 NOTE — HISTORY OF PRESENT ILLNESS
[FreeTextEntry1] : scrotal pruritus [de-identified] : 20 year old male here with rash on scrotum. no tx tried aside from ketoconazole shampoo. \par had rash on back. resolved s/p keto shampoo.

## 2021-04-20 NOTE — ASSESSMENT
[FreeTextEntry1] : back\par was likely tinea versicolor\par education\par resolved\par can use head and shoulders 1-2x a week in summer; SED\par \par scrotal pruritus\par red scrotum \par education\par wash out\par protopic ointment BID; SED

## 2021-04-26 ENCOUNTER — APPOINTMENT (OUTPATIENT)
Dept: ORTHOPEDIC SURGERY | Facility: CLINIC | Age: 21
End: 2021-04-26
Payer: COMMERCIAL

## 2021-04-26 DIAGNOSIS — M25.362 OTHER INSTABILITY, LEFT KNEE: ICD-10-CM

## 2021-04-26 DIAGNOSIS — S83.005D UNSPECIFIED DISLOCATION OF LEFT PATELLA, SUBSEQUENT ENCOUNTER: ICD-10-CM

## 2021-04-26 PROCEDURE — 99441: CPT

## 2021-05-06 PROBLEM — S83.005D DISLOCATION OF LEFT PATELLA, SUBSEQUENT ENCOUNTER: Status: ACTIVE | Noted: 2021-04-12

## 2021-06-10 ENCOUNTER — APPOINTMENT (OUTPATIENT)
Dept: PSYCHIATRY | Facility: CLINIC | Age: 21
End: 2021-06-10
Payer: COMMERCIAL

## 2021-06-10 PROCEDURE — 99214 OFFICE O/P EST MOD 30 MIN: CPT | Mod: 95

## 2021-12-16 ENCOUNTER — APPOINTMENT (OUTPATIENT)
Dept: PSYCHIATRY | Facility: CLINIC | Age: 21
End: 2021-12-16

## 2021-12-23 ENCOUNTER — APPOINTMENT (OUTPATIENT)
Dept: PSYCHIATRY | Facility: CLINIC | Age: 21
End: 2021-12-23
Payer: COMMERCIAL

## 2021-12-23 PROCEDURE — 99214 OFFICE O/P EST MOD 30 MIN: CPT | Mod: 95

## 2022-01-13 ENCOUNTER — APPOINTMENT (OUTPATIENT)
Dept: INTERNAL MEDICINE | Facility: CLINIC | Age: 22
End: 2022-01-13

## 2022-05-11 ENCOUNTER — APPOINTMENT (OUTPATIENT)
Dept: ORTHOPEDIC SURGERY | Facility: CLINIC | Age: 22
End: 2022-05-11

## 2022-05-11 PROCEDURE — 73600 X-RAY EXAM OF ANKLE: CPT | Mod: LT

## 2022-05-11 PROCEDURE — 99213 OFFICE O/P EST LOW 20 MIN: CPT

## 2022-05-12 VITALS — HEIGHT: 71 IN

## 2022-05-12 NOTE — DISCUSSION/SUMMARY
[de-identified] : At this time, due to lateral left ankle sprain, I recommend a low-profile brace weightbearing as tolerated and reassessment in 4-6 weeks.

## 2022-05-12 NOTE — PHYSICAL EXAM
[de-identified] : Right Ankle:\par Ankle: Range of Motion in Degrees:\par 	                                Claimant:	                Normal:	\par Dorsiflexion (Active)	40-degrees	40-degrees	\par Dorsiflexion (Passive)	40-degrees	40-degrees	\par Plantar (Active)	                40-degrees	40-degrees	\par Plantar (Passive)	                40-degrees	40-degrees	\par Inversion (Active)	                30-degrees	30-degrees	\par Inversion (Passive)	                30-degrees	30-degrees	\par Eversion  (Active)	                20-degrees	20-degrees	\par Eversion (Passive) 	                20-degrees	20-degrees	\par \par No weakness in dorsiflexion, plantar flexion, inversion or eversion.  Normal sensation.  No tenderness over the medial or lateral ligaments.  No tenderness over the DLES.  No evidence of instability.  Negative anterior drawer sign.  No medial or lateral bony tenderness.  No proximal fibular tenderness.  No anterior, posterior, medial or lateral tendon tenderness.  No intra-articular swelling.  No extra-articular swelling, edema or tenderness.  No tenderness over the plantar aspect of the os calcis.  2+ DP and PT pulses. Skin is intact.  No rashes, scars or lesions.  \par  \par Left Ankle:\par Ankle: Range of Motion in Degrees:\par 	                                Claimant:	Normal:	\par Dorsiflexion (Active)	40-degrees	40-degrees	\par Dorsiflexion (Passive)	40-degrees	40-degrees	\par Plantar (Active)       	40-degrees	40-degrees	\par Plantar (Passive)     	40-degrees	40-degrees	\par Inversion (Active)    	30-degrees	30-degrees	\par Inversion (Passive)  	30-degrees	30-degrees	\par Eversion  (Active)   	20-degrees	20-degrees	\par Eversion (Passive)   	20-degrees	20-degrees	\par \par No weakness in dorsiflexion, plantar flexion, inversion or eversion.  Normal sensation.  Positive diffuse swelling laterally.  Tender over the lateral ligaments.  No tenderness over the medial  ligaments.  No tenderness over the DLES.  No evidence of instability.  Negative anterior drawer sign.  No medial bony tenderness.  No proximal fibular tenderness.  No anterior, posterior, medial or lateral tendon tenderness.  No intra-articular swelling.  No extra-articular swelling, edema or tenderness.  No tenderness over the plantar aspect of the os calcis.  2+ DP and PT pulses. Skin is intact.  No rashes, scars or lesions.  \par   [de-identified] : Gait and Station:  Ambulating with a slightly antalgic to antalgic gait.  Station:  Normal.  [de-identified] : Appearance:  Well-developed, well-nourished male in no acute distress.\par   [de-identified] : Radiographs, two views of the left ankle taken today in the office, show no obvious osseous abnormalities.

## 2022-05-12 NOTE — ADDENDUM
[FreeTextEntry1] : This note was written by Shannan Busch on 05/12/2022 acting as scribe for Everton Cooper III, MD

## 2022-05-12 NOTE — HISTORY OF PRESENT ILLNESS
[de-identified] : The patient comes in today, after not being seen in a while, stating he rolled his left ankle two months ago and is having persistent complaints of pain.

## 2022-06-22 ENCOUNTER — APPOINTMENT (OUTPATIENT)
Dept: ORTHOPEDIC SURGERY | Facility: CLINIC | Age: 22
End: 2022-06-22

## 2022-06-22 DIAGNOSIS — S93.402A SPRAIN OF UNSPECIFIED LIGAMENT OF LEFT ANKLE, INITIAL ENCOUNTER: ICD-10-CM

## 2022-06-22 PROCEDURE — 99213 OFFICE O/P EST LOW 20 MIN: CPT

## 2022-06-23 PROBLEM — S93.402A SPRAIN OF LEFT ANKLE, UNSPECIFIED LIGAMENT, INITIAL ENCOUNTER: Status: ACTIVE | Noted: 2022-05-11

## 2022-06-28 NOTE — ADDENDUM
[FreeTextEntry1] : This note was written by Antionette Lopez on 06/28/2022 acting as a scribe for ANSON GIRALDO III, MD

## 2022-06-28 NOTE — DISCUSSION/SUMMARY
[de-identified] : At this time, the patient is doing well with the sprain of the left ankle.  He will return to full activity.  He will follow up on an as needed basis.

## 2022-06-28 NOTE — PHYSICAL EXAM
[de-identified] : Left Ankle: \par Range of Motion in Degrees:\par 	                                Claimant:	                Normal:	\par Dorsiflexion (Active)	40-degrees	40-degrees	\par Dorsiflexion (Passive)	40-degrees	40-degrees	\par Plantar (Active)	                40-degrees	40-degrees	\par Plantar (Passive)	                40-degrees	40-degrees	\par Inversion (Active)	                30-degrees	30-degrees	\par Inversion (Passive)	                30-degrees	30-degrees	\par Eversion  (Active)	                20-degrees	20-degrees	\par Eversion (Passive) 	                20-degrees	20-degrees	\par \par No weakness in dorsiflexion, plantar flexion, inversion or eversion.  Normal sensation.  No tenderness over the medial or lateral ligaments.  No tenderness over the DLES.  No evidence of instability.  Negative anterior drawer sign.  No medial or lateral bony tenderness.  No proximal fibular tenderness.  No anterior, posterior, medial or lateral tendon tenderness.  No intra-articular swelling.  No extra-articular swelling, edema or tenderness.  No tenderness over the plantar aspect of the os calcis.  2+ DP and PT pulses. Skin is intact.  No rashes, scars or lesions.  \par   [de-identified] : Ambulating with a slightly antalgic to antalgic gait.  Station:  Normal.  [de-identified] : Appearance:  Well-developed, well-nourished male in no acute distress.\par

## 2022-10-28 ENCOUNTER — APPOINTMENT (OUTPATIENT)
Dept: PSYCHIATRY | Facility: CLINIC | Age: 22
End: 2022-10-28

## 2022-10-28 ENCOUNTER — NON-APPOINTMENT (OUTPATIENT)
Age: 22
End: 2022-10-28

## 2022-10-28 NOTE — DISCUSSION/SUMMARY
[FreeTextEntry1] : Covering provider was requested to send a prescription for escitalopram. \par Chart reviewed.  Patient was last seen by his primary provider Dr. Fu on December 23, 2021.  He was advised to follow up in 6 months and patient had an appointment today with  and the appointment had to be cancelled due to provider not being available today. \par It is noted that patient had refills provided by other providers in the practice and Dr. Fu had given patient a prescription on 10/17 for 2 weeks. \par Request appropriate, will send Rx till next scheduled appointment with Dr. Fu on 11/17/2022\par

## 2022-11-17 ENCOUNTER — APPOINTMENT (OUTPATIENT)
Dept: PSYCHIATRY | Facility: CLINIC | Age: 22
End: 2022-11-17

## 2022-11-17 PROCEDURE — 99214 OFFICE O/P EST MOD 30 MIN: CPT | Mod: 95

## 2022-11-17 NOTE — HISTORY OF PRESENT ILLNESS
[FreeTextEntry1] : Patient has a job working for [employer] in [employer].  Moods have been stable.  Socialization is good.  Taking vitamin D.  No new medications or medical problems. [Home] : at home, [unfilled] , at the time of the visit. 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 Patient states he has a lot of anxiety which has prevented him from going to college and functioning. He feels the symptoms began somewhere around age 12 or 13. They be gradually getting worse. The patient has been in therapy but he is never been on any psychiatric medication. He describes the anxiety is constant. He worries all the time. He over thinks things. Patient had a panic attack and he thanked me for some of her disease. Patient constantly worries about health issues. He graduated from high school 2 years ago but recalls because he is afraid of his younger. His father is a angry person with bipolar. The patient's anxiety is increased from father. He is always worried about getting the father angry. Work is very [employer]. situation except when he is on the line in a [employer]. Patient also has a stressor another best friend  last week from an overdose. Patient denies any substance abuse. Patient eats one big healthy meal a day he does not exercise.\par \par Patient gets up at 9:00 in the morning. He works from 10:30 until about 7:00 at night. After work 1 C play video games. He goes to bed at midnight. No problems falling asleep or staying asleep. Appetite decreased height 6 feet 1 inches weight 145 pounds. Energy decreased.

## 2022-11-17 NOTE — CURRENT PSYCHIATRIC SYMPTOMS
[Excessive Worry] : no excessive worries [Restlessness] : restlessness [Hypochondriasis] : no hypochondriasis [Panic] : no panic disorder [de-identified] : denied [de-identified] : denied [de-identified] : denied [de-identified] : denied [de-identified] : none [de-identified] : none [de-identified] : none
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
## 2022-11-17 NOTE — SOCIAL HISTORY
[FreeTextEntry1] : The patient grew up in [place]. He attended [employer] school graduating 2017. High school was okay he a lot of anxiety he had very few friends he was a poor student. After high school he went from job to job. I would like to go back to community college and then go to culinary school.

## 2022-11-17 NOTE — DISCUSSION/SUMMARY
[FreeTextEntry1] : 22-year-old male with anxiety depression and anger.  Plan continue Lexapro 20 mg.  Follow-up in 6 months.

## 2022-11-17 NOTE — PAST MEDICAL HISTORY
[FreeTextEntry1] : Patient's anxiety began about age [age]. Patient currently on no medication. Patient in therapy.

## 2022-11-28 ENCOUNTER — NON-APPOINTMENT (OUTPATIENT)
Age: 22
End: 2022-11-28

## 2022-11-30 ENCOUNTER — NON-APPOINTMENT (OUTPATIENT)
Age: 22
End: 2022-11-30

## 2022-12-06 ENCOUNTER — NON-APPOINTMENT (OUTPATIENT)
Age: 22
End: 2022-12-06

## 2022-12-06 ENCOUNTER — APPOINTMENT (OUTPATIENT)
Dept: CARDIOLOGY | Facility: CLINIC | Age: 22
End: 2022-12-06

## 2022-12-06 VITALS
HEIGHT: 71 IN | BODY MASS INDEX: 23.8 KG/M2 | SYSTOLIC BLOOD PRESSURE: 125 MMHG | DIASTOLIC BLOOD PRESSURE: 75 MMHG | HEART RATE: 63 BPM | OXYGEN SATURATION: 99 % | WEIGHT: 170 LBS | RESPIRATION RATE: 16 BRPM

## 2022-12-06 PROCEDURE — 99205 OFFICE O/P NEW HI 60 MIN: CPT | Mod: 25

## 2022-12-06 PROCEDURE — 93000 ELECTROCARDIOGRAM COMPLETE: CPT

## 2022-12-16 NOTE — ASSESSMENT
[FreeTextEntry1] : 22 year old man with PMHx of anxiety (lexapro) who presents with palpitations. EKG is not concerning with no pre-excitation. His shortness of breath is improved with exercise. Plan for holter to define any abnormal heart rate patterns. WIll follow up closely with patient. \par \par Differential diagnosis and plan of care discussed with patient after the evaluation. \par Counseling on diet, exercise, and medication compliance was done.\par Counseling on smoking and alcohol cessation was offered when appropriate.\par Pain assessed and judicious use of narcotics when appropriate was discussed.\par Importance of fall prevention discussed.\par Advanced care planning was discussed with patient and family.\par

## 2022-12-16 NOTE — HISTORY OF PRESENT ILLNESS
[FreeTextEntry1] : 22 year old man with PMHx of anxiety (lexapro) who presents with palpitations as well as shortness of breath. Also feels chest pain if he feels heart rate is going fast. This happens throughout the day. \par He wakes up around 6 am and then heart rate issue goes away at 8.\par Breathing issue is all day, bernice if in humid environment. \par Exercises infrequently but when outside, exercises, breathing feels better. \par talking dogs on walk and is in  (8hours)- doesn’t feel chest pain. \par \par FH- father CABG. 61 yo

## 2023-02-23 ENCOUNTER — APPOINTMENT (OUTPATIENT)
Dept: INTERNAL MEDICINE | Facility: CLINIC | Age: 23
End: 2023-02-23
Payer: COMMERCIAL

## 2023-02-23 VITALS
WEIGHT: 197 LBS | HEART RATE: 73 BPM | DIASTOLIC BLOOD PRESSURE: 80 MMHG | BODY MASS INDEX: 27.58 KG/M2 | TEMPERATURE: 98.6 F | OXYGEN SATURATION: 98 % | SYSTOLIC BLOOD PRESSURE: 108 MMHG | HEIGHT: 71 IN

## 2023-02-23 DIAGNOSIS — R00.2 PALPITATIONS: ICD-10-CM

## 2023-02-23 DIAGNOSIS — R09.81 NASAL CONGESTION: ICD-10-CM

## 2023-02-23 PROCEDURE — 99213 OFFICE O/P EST LOW 20 MIN: CPT

## 2023-02-23 RX ORDER — MUPIROCIN 20 MG/G
2 OINTMENT TOPICAL 3 TIMES DAILY
Qty: 1 | Refills: 2 | Status: DISCONTINUED | COMMUNITY
Start: 2021-04-30 | End: 2023-02-23

## 2023-02-23 RX ORDER — TACROLIMUS 1 MG/G
0.1 OINTMENT TOPICAL
Qty: 1 | Refills: 4 | Status: DISCONTINUED | COMMUNITY
Start: 2021-04-20 | End: 2023-02-23

## 2023-02-23 RX ORDER — AZITHROMYCIN 250 MG/1
250 TABLET, FILM COATED ORAL
Qty: 1 | Refills: 1 | Status: ACTIVE | COMMUNITY
Start: 2023-02-23 | End: 1900-01-01

## 2023-02-23 RX ORDER — ASPIRIN 81 MG
6.5 TABLET, DELAYED RELEASE (ENTERIC COATED) ORAL
Qty: 1 | Refills: 0 | Status: DISCONTINUED | COMMUNITY
Start: 2021-03-30 | End: 2023-02-23

## 2023-02-23 NOTE — HISTORY OF PRESENT ILLNESS
[FreeTextEntry8] : Mr. SHAYNE KENT is a 22 year M who comes in for an acute visit.\par PT notes having congestion and post nasal drip that occurs year round. \par He recently started with sore throat, congestion, sneezing 2 days. No home covid testing done. \par He did see cardiology for palpitations and had work up done and was noted to decrease in caffeine intake. \par Patient denies any cp, sob,abdominal pain, nausea, vomiting, palpitations, fever, chills, constipation, diarrhea.\par

## 2023-02-23 NOTE — ASSESSMENT
[FreeTextEntry1] : 1.sinus congestion: COVID PCR nasopharyngeal swab obtained, patient uncomfortable post nasal swab and returned to baseline.  Advised to quarantine until results are available.\par Start on Z-Mehrdad. Went over instructions and side effects of medication.\par Increase fluids, rest. \par Take Tylenol 500 mg 1-2 tabs as needed every 8 hours for pain. \par Call for new or worsening symptoms.\par Does not wish to have blood work done.\par \par 2.palpitations: Has had cardiac work-up done and advised to cut back on caffeine.\par \par Advised to return in 3 months for CPE.

## 2023-02-24 LAB — SARS-COV-2 N GENE NPH QL NAA+PROBE: NOT DETECTED

## 2023-04-21 ENCOUNTER — EMERGENCY (EMERGENCY)
Facility: HOSPITAL | Age: 23
LOS: 0 days | Discharge: HOME | End: 2023-04-21
Attending: STUDENT IN AN ORGANIZED HEALTH CARE EDUCATION/TRAINING PROGRAM
Payer: COMMERCIAL

## 2023-04-21 VITALS
TEMPERATURE: 98 F | RESPIRATION RATE: 18 BRPM | HEART RATE: 62 BPM | DIASTOLIC BLOOD PRESSURE: 71 MMHG | OXYGEN SATURATION: 96 % | SYSTOLIC BLOOD PRESSURE: 110 MMHG

## 2023-04-21 VITALS — WEIGHT: 199.96 LBS | HEIGHT: 71 IN

## 2023-04-21 DIAGNOSIS — S93.402A SPRAIN OF UNSPECIFIED LIGAMENT OF LEFT ANKLE, INITIAL ENCOUNTER: ICD-10-CM
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
## 2023-04-21 DIAGNOSIS — M25.572 PAIN IN LEFT ANKLE AND JOINTS OF LEFT FOOT: ICD-10-CM
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
## 2023-04-21 PROCEDURE — 99284 EMERGENCY DEPT VISIT MOD MDM: CPT

## 2023-04-21 PROCEDURE — 99283 EMERGENCY DEPT VISIT LOW MDM: CPT | Mod: 25

## 2023-04-21 PROCEDURE — 73610 X-RAY EXAM OF ANKLE: CPT | Mod: 26,LT

## 2023-04-21 PROCEDURE — 73610 X-RAY EXAM OF ANKLE: CPT | Mod: LT

## 2023-04-21 NOTE — ED STATDOCS - CLINICAL SUMMARY MEDICAL DECISION MAKING FREE TEXT BOX
21 y/o male w/ left ankle injury. Plan for XR r/o fracture, pain control, and reassess. 21 y/o male w/ left ankle injury. Plan for XR r/o fracture, pain control, and reassess.    signed Dulce Maria Maria PA-C Pt seen initially in intake by Dr Chakraborty.   22M c/o left ankle pain after he rolled it yesterday walking on an uneven floor. Pt ambulates with mildly antalgic gait in ED, declines splint or crutches. Has appt at Dr Cooper's office tomorrow. Pt feeling well at [place], agrees with DC and plan of care. No significant findings on xray.

## 2023-04-21 NOTE — ED STATDOCS - NS ED ATTENDING STATEMENT MOD
This was a shared visit with the FAREED. I reviewed and verified the documentation and independently performed the documented:

## 2023-04-21 NOTE — ED STATDOCS - PATIENT PORTAL LINK FT
You can access the FollowMyHealth Patient Portal offered by [facility] by registering at the following website: [email or web address]. By joining [employer]’s FollowMyHealth portal, you will also be able to view your health information using other applications (apps) compatible with our system.

## 2023-04-21 NOTE — ED STATDOCS - ATTENDING APP SHARED VISIT CONTRIBUTION OF CARE
I, Rosy Chakraborty DO,  performed the initial face to face bedside interview with this patient regarding history of present illness, review of symptoms and relevant past medical, social and family history.  I completed an independent physical examination.  I was the initial provider who evaluated this patient.   I personally saw the patient and performed a substantive portion of the visit including all aspects of the medical decision making.  I have signed out the follow up of any pending tests (i.e. labs, radiological studies) to the ACP.  I have communicated the patient’s plan of care and disposition with the ACP.  The history, relevant review of systems, past medical and surgical history, medical decision making, and physical examination was documented by the scribe in my presence and I attest to the accuracy of the documentation.

## 2023-04-21 NOTE — ED STATDOCS - CARE PROVIDER_API CALL
Everton Cooper)  Orthopaedic Surgery  [address] [place] [address]  Phone: [phone]  Fax: [phone]  Follow Up Time:

## 2023-04-21 NOTE — ED STATDOCS - OBJECTIVE STATEMENT
21 y/o male w/ no pertinent PMHx presents to the ED s/p left ankle injury last night. Pt reports he rolled his ankle while going down the stairs, states he missed a step, denies fall, head strike, or LOC. Pt c/o left ankle pain and difficulty bearing weight. Pt able to ambulate. No other complaints at this time.

## 2023-04-22 ENCOUNTER — APPOINTMENT (OUTPATIENT)
Dept: ORTHOPEDIC SURGERY | Facility: CLINIC | Age: 23
End: 2023-04-22

## 2023-05-30 ENCOUNTER — APPOINTMENT (OUTPATIENT)
Dept: ORTHOPEDIC SURGERY | Facility: CLINIC | Age: 23
End: 2023-05-30

## 2023-06-23 ENCOUNTER — APPOINTMENT (OUTPATIENT)
Dept: PSYCHIATRY | Facility: CLINIC | Age: 23
End: 2023-06-23
Payer: COMMERCIAL

## 2023-06-23 PROCEDURE — 99214 OFFICE O/P EST MOD 30 MIN: CPT | Mod: 95

## 2023-06-23 NOTE — HISTORY OF PRESENT ILLNESS
[FreeTextEntry1] : Moods have been stable doing well.  Has a new job working for [employer].  Sleep appetite weight good.  Patient injured his ankle stepped off a stairway and twisted it.  Going on vacation to [place].  No new medications. [Home] : at home, [unfilled] , at the time of the visit. 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 Patient states he has a lot of anxiety which has prevented him from going to college and functioning. He feels the symptoms began somewhere around age 12 or 13. They be gradually getting worse. The patient has been in therapy but he is never been on any psychiatric medication. He describes the anxiety is constant. He worries all the time. He over thinks things. Patient had a panic attack and he thanked me for some of her disease. Patient constantly worries about health issues. He graduated from high school 2 years ago but recalls because he is afraid of his younger. His father is a angry person with bipolar. The patient's anxiety is increased from father. He is always worried about getting the father angry. Work is very U. situation except when he is on the line in a [employer]. Patient also has a stressor another best friend  last week from an overdose. Patient denies any substance abuse. Patient eats one big healthy meal a day he does not exercise.\par \par Patient gets up at 9:00 in the morning. He works from 10:30 until about 7:00 at night. After work 1 C play video games. He goes to bed at midnight. No problems falling asleep or staying asleep. Appetite decreased height 6 feet 1 inches weight 145 pounds. Energy decreased.

## 2023-06-23 NOTE — CURRENT PSYCHIATRIC SYMPTOMS
[Excessive Worry] : no excessive worries [Restlessness] : restlessness [Hypochondriasis] : no hypochondriasis [Panic] : no panic disorder [de-identified] : denied [de-identified] : denied [de-identified] : denied [de-identified] : denied [de-identified] : none [de-identified] : none [de-identified] : none

## 2023-06-23 NOTE — SOCIAL HISTORY
[FreeTextEntry1] : The patient grew up in [place]. He attended [employer] school graduating 2017. High school was okay he a lot of anxiety he had very few friends he was a poor student. After high school he went from job to job. I would like to go back to community college and then go to culinary school.
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
## 2023-06-28 NOTE — DISCHARGE NOTE ADULT - FUNCTIONAL SCREEN CURRENT LEVEL: AMBULATION, MLM
Anesthesia Post-op Note    Patient: Brooke Clayton  Procedure(s) Performed: EGDCOLONOSCOPY  Anesthesia type: MAC    Vitals Value Taken Time   Temp 36.5 °C (97.7 °F) 06/28/23 1420   Pulse 85 06/28/23 1420   Resp 17 06/28/23 1420   SpO2 99 % 06/28/23 1420   /79 06/28/23 1420         Patient Location: Phase II  Post-op Vital Signs:stable  Level of Consciousness: awake and alert  Respiratory Status: spontaneous ventilation  Cardiovascular stable  Hydration: euvolemic  Pain Management: adequately controlled  Vomiting: none  Nausea: None  Airway Patency:patent  Post-op Assessment: awake, alert, appropriately conversant, or baseline, no complications, patient tolerated procedure well, no evidence of recall and dentition within defined limits      There were no known notable events for this encounter.  
(0) independent

## 2023-08-24 ENCOUNTER — APPOINTMENT (OUTPATIENT)
Dept: INTERNAL MEDICINE | Facility: CLINIC | Age: 23
End: 2023-08-24

## 2024-01-12 ENCOUNTER — APPOINTMENT (OUTPATIENT)
Dept: PSYCHIATRY | Facility: CLINIC | Age: 24
End: 2024-01-12

## 2024-01-19 ENCOUNTER — APPOINTMENT (OUTPATIENT)
Dept: PSYCHIATRY | Facility: CLINIC | Age: 24
End: 2024-01-19
Payer: COMMERCIAL

## 2024-01-19 DIAGNOSIS — F41.9 ANXIETY DISORDER, UNSPECIFIED: ICD-10-CM

## 2024-01-19 DIAGNOSIS — F41.0 PANIC DISORDER [EPISODIC PAROXYSMAL ANXIETY]: ICD-10-CM

## 2024-01-19 DIAGNOSIS — R45.4 IRRITABILITY AND ANGER: ICD-10-CM

## 2024-01-19 PROCEDURE — 99214 OFFICE O/P EST MOD 30 MIN: CPT | Mod: 95

## 2024-01-19 RX ORDER — ESCITALOPRAM OXALATE 20 MG/1
20 TABLET ORAL DAILY
Qty: 90 | Refills: 1 | Status: ACTIVE | COMMUNITY
Start: 2019-09-09 | End: 1900-01-01

## 2024-01-19 NOTE — CURRENT PSYCHIATRIC SYMPTOMS
[Excessive Worry] : no excessive worries [Restlessness] : restlessness [Hypochondriasis] : no hypochondriasis [Panic] : no panic disorder [de-identified] : denied [de-identified] : denied [de-identified] : denied [de-identified] : denied [de-identified] : none [de-identified] : none [de-identified] : none

## 2024-01-19 NOTE — SOCIAL HISTORY
[FreeTextEntry1] : The patient grew up in [place]. He attended [employer] school graduating 2017. High school was okay he a lot of anxiety he had very few friends he was a poor student. After high school he went from job to job. I would like to go back to community college and then go to culinary school.
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
## 2024-01-19 NOTE — DISCUSSION/SUMMARY
[FreeTextEntry1] : 23-year-old male with anxiety depression and anger.  Plan continue Lexapro 20 mg.  Follow-up in 6 months.

## 2024-01-19 NOTE — HISTORY OF PRESENT ILLNESS
[Home] : at home, [unfilled] , at the time of the visit. 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 Patient states he has a lot of anxiety which has prevented him from going to college and functioning. He feels the symptoms began somewhere around age 12 or 13. They be gradually getting worse. The patient has been in therapy but he is never been on any psychiatric medication. He describes the anxiety is constant. He worries all the time. He over thinks things. Patient had a panic attack and he thanked me for some of her disease. Patient constantly worries about health issues. He graduated from high school 2 years ago but recalls because he is afraid of his younger. His father is a angry person with bipolar. The patient's anxiety is increased from father. He is always worried about getting the father angry. Work is very [employer]. situation except when he is on the line in a [employer]. Patient also has a stressor another best friend  last week from an overdose. Patient denies any substance abuse. Patient eats one big healthy meal a day he does not exercise.\par  \par  Patient gets up at 9:00 in the morning. He works from 10:30 until about 7:00 at night. After work 1 C play video games. He goes to bed at midnight. No problems falling asleep or staying asleep. Appetite decreased height 6 feet 1 inches weight 145 pounds. Energy decreased. [FreeTextEntry1] : Moods good no problem with medication.

## 2024-08-07 ENCOUNTER — APPOINTMENT (OUTPATIENT)
Dept: ORTHOPEDIC SURGERY | Facility: CLINIC | Age: 24
End: 2024-08-07

## 2024-08-07 PROCEDURE — 99213 OFFICE O/P EST LOW 20 MIN: CPT

## 2024-08-07 PROCEDURE — 73650 X-RAY EXAM OF HEEL: CPT | Mod: LT

## 2024-08-08 PROBLEM — M25.362 PATELLAR INSTABILITY OF LEFT KNEE: Status: ACTIVE | Noted: 2024-08-07

## 2024-08-08 NOTE — DISCUSSION/SUMMARY
[de-identified] : The patient presents with patellar instability, left knee.  At this time I recommend a patellar stabilization brace and home therapeutic modalities.  He will be reassessed in three weeks.  We will discuss the potential for further intervention.

## 2024-08-08 NOTE — HISTORY OF PRESENT ILLNESS
[de-identified] : Britton comes in today for his left knee.  He stepped, turned his knee, and felt his kneecap pop out on him.  This happened once four years ago.

## 2024-08-08 NOTE — ADDENDUM
[FreeTextEntry1] : This note was written by Ely Warren on 08/08/2024 acting as scribe for Everton Cooper III, MD

## 2024-09-05 ENCOUNTER — APPOINTMENT (OUTPATIENT)
Dept: ORTHOPEDIC SURGERY | Facility: CLINIC | Age: 24
End: 2024-09-05

## 2024-09-05 VITALS
SYSTOLIC BLOOD PRESSURE: 107 MMHG | WEIGHT: 215 LBS | HEART RATE: 65 BPM | HEIGHT: 71 IN | BODY MASS INDEX: 30.1 KG/M2 | DIASTOLIC BLOOD PRESSURE: 74 MMHG

## 2024-09-05 DIAGNOSIS — M25.362 OTHER INSTABILITY, LEFT KNEE: ICD-10-CM

## 2024-09-05 PROCEDURE — 99213 OFFICE O/P EST LOW 20 MIN: CPT

## 2024-09-10 NOTE — PHYSICAL EXAM
[de-identified] : Left Knee: Knee: Range of Motion in Degrees                             Claimant: Normal:  Flexion Active     135    135-degrees  Flexion Passive  135   135-degrees  Extension Active  0-5   0-5-degrees  Extension Passive  0-5   0-5-degrees   Moderate effusion. Positive apprehension. No weakness to flexion/extension. No evidence of instability in the AP plane or varus or valgus stress. Negative Lachman. Negative pivot shift. Negative anterior drawer test. Negative posterior drawer test. Negative Neto. Negative Apley grind. No medial or lateral joint line tenderness. No tenderness over the medial and lateral facet of the patella. No patellofemoral crepitations. No lateral tilting patella. No crepitation in the medial and lateral femoral condyle.  No gross motor or sensory deficits. 2+ DP and PT pulses. No varus or valgus malalignment. Skin is intact. No rashes, scars or lesions.    [de-identified] : Gait and Station:  Ambulating with a slightly antalgic to antalgic gait.  Normal Station.  [de-identified] : Appearance:  Well-developed, well-nourished male in no acute distress.

## 2024-09-10 NOTE — ADDENDUM
[FreeTextEntry1] : This note was written by Noe Burrell on 09/10/2024, acting as a scribe for Everton Cooper III, MD

## 2024-09-10 NOTE — DISCUSSION/SUMMARY
[de-identified] : At this time, due to left knee patellar instability, he will start on outpatient physical therapy and be reassessed in 6 weeks.

## 2024-09-10 NOTE — HISTORY OF PRESENT ILLNESS
[de-identified] : The patient comes in today for his left knee.  He states he is definitely feeling better with the brace on.

## 2024-09-10 NOTE — PHYSICAL EXAM
[de-identified] : Left Knee: Knee: Range of Motion in Degrees                             Claimant: Normal:  Flexion Active     135    135-degrees  Flexion Passive  135   135-degrees  Extension Active  0-5   0-5-degrees  Extension Passive  0-5   0-5-degrees   Moderate effusion. Positive apprehension. No weakness to flexion/extension. No evidence of instability in the AP plane or varus or valgus stress. Negative Lachman. Negative pivot shift. Negative anterior drawer test. Negative posterior drawer test. Negative Neto. Negative Apley grind. No medial or lateral joint line tenderness. No tenderness over the medial and lateral facet of the patella. No patellofemoral crepitations. No lateral tilting patella. No crepitation in the medial and lateral femoral condyle.  No gross motor or sensory deficits. 2+ DP and PT pulses. No varus or valgus malalignment. Skin is intact. No rashes, scars or lesions.    [de-identified] : Gait and Station:  Ambulating with a slightly antalgic to antalgic gait.  Normal Station.  [de-identified] : Appearance:  Well-developed, well-nourished male in no acute distress.

## 2024-09-10 NOTE — DISCUSSION/SUMMARY
[de-identified] : At this time, due to left knee patellar instability, he will start on outpatient physical therapy and be reassessed in 6 weeks.

## 2024-09-10 NOTE — HISTORY OF PRESENT ILLNESS
[de-identified] : The patient comes in today for his left knee.  He states he is definitely feeling better with the brace on.

## 2024-09-25 ENCOUNTER — APPOINTMENT (OUTPATIENT)
Dept: INTERNAL MEDICINE | Facility: CLINIC | Age: 24
End: 2024-09-25
Payer: COMMERCIAL

## 2024-09-25 VITALS
HEART RATE: 72 BPM | TEMPERATURE: 98.4 F | WEIGHT: 208 LBS | SYSTOLIC BLOOD PRESSURE: 118 MMHG | OXYGEN SATURATION: 96 % | BODY MASS INDEX: 29.12 KG/M2 | HEIGHT: 71 IN | DIASTOLIC BLOOD PRESSURE: 72 MMHG

## 2024-09-25 DIAGNOSIS — F41.9 ANXIETY DISORDER, UNSPECIFIED: ICD-10-CM

## 2024-09-25 DIAGNOSIS — F41.0 PANIC DISORDER [EPISODIC PAROXYSMAL ANXIETY]: ICD-10-CM

## 2024-09-25 PROCEDURE — 99214 OFFICE O/P EST MOD 30 MIN: CPT

## 2024-09-25 PROCEDURE — G2211 COMPLEX E/M VISIT ADD ON: CPT | Mod: NC

## 2024-09-25 NOTE — ASSESSMENT
[FreeTextEntry1] : 1.anxiety disorder with history of panic attacks.  Restart back on Lexapro 20 mg once daily. Counseling provided to the patient. Went over instructions and side effects of medication. Advised we will give 90 tablets to hold him over until he is able to establish care with psychiatry, given referral to mental health and advised that they do not take his insurance to call his insurance for a list of providers.

## 2024-09-25 NOTE — PHYSICAL EXAM
Goal Outcome Evaluation:      Plan of Care Reviewed With: parent    Overall Patient Progress: improving     Outcome Evaluation: Vital signs stable with no apnea, bradycardia or desaturation events this shift. Tolerating gavage feedings over 45 minutes without emesis. Voiding and stooling. Parents were here this evening, participating in cares.  Appropriate bonding behaviors noted.    Problem: Infant Inpatient Plan of Care  Goal: Plan of Care Review  Outcome: Progressing  Goal: Absence of Hospital-Acquired Illness or Injury  2024 2259 by Manuela Villa RN  Outcome: Progressing  2024 2258 by Manuela Villa RN  Outcome: Progressing  Intervention: Prevent Skin Injury  Recent Flowsheet Documentation  Taken 2024 2100 by Manuela Villa RN  Skin Protection: adhesive use limited  Device Skin Pressure Protection: tubing/devices free from skin contact  Taken 2024 1800 by Manuela Villa RN  Skin Protection:   adhesive use limited   pulse oximeter probe site changed  Device Skin Pressure Protection: tubing/devices free from skin contact  Intervention: Prevent Infection  Recent Flowsheet Documentation  Taken 2024 2100 by Manuela Villa RN  Infection Prevention:   environmental surveillance performed   equipment surfaces disinfected   rest/sleep promoted  Taken 2024 1800 by Manuela Villa RN  Infection Prevention:   environmental surveillance performed   equipment surfaces disinfected   rest/sleep promoted  Goal: Optimal Comfort and Wellbeing  2024 2259 by Manuela Villa RN  Outcome: Progressing  2024 2258 by Manuela Villa RN  Outcome: Progressing  Intervention: Provide Person-Centered Care  Recent Flowsheet Documentation  Taken 2024 1530 by Manuela Villa RN  Psychosocial Support:   care explained to patient/family prior to performing   choices provided for parent/caregiver   goal setting facilitated   presence/involvement promoted    questions encouraged/answered   self-care promoted   support provided   supportive/safe environment provided     Problem:   Goal: Demonstration of Attachment Behaviors  2024 by Manuela Villa RN  Outcome: Progressing  2024 by Manuela Villa RN  Outcome: Progressing  Intervention: Promote Infant-Parent Attachment  Recent Flowsheet Documentation  Taken 2024 1530 by Manuela Villa RN  Psychosocial Support:   care explained to patient/family prior to performing   choices provided for parent/caregiver   goal setting facilitated   presence/involvement promoted   questions encouraged/answered   self-care promoted   support provided   supportive/safe environment provided  Parent-Child Attachment Promotion:   cue recognition promoted   parent/caregiver presence encouraged   participation in care promoted   strengths emphasized   caring behavior modeled   interaction encouraged   positive reinforcement provided  Goal: Absence of Infection Signs and Symptoms  2024 by Manuela Villa RN  Outcome: Progressing  2024 by Manuela Villa RN  Outcome: Progressing  Intervention: Prevent or Manage Infection  Recent Flowsheet Documentation  Taken 2024 2100 by Manuela Villa RN  Infection Prevention:   environmental surveillance performed   equipment surfaces disinfected   rest/sleep promoted  Taken 2024 1800 by Manuela Villa RN  Infection Prevention:   environmental surveillance performed   equipment surfaces disinfected   rest/sleep promoted  Goal: Effective Oral Intake  2024 by Manuela Villa RN  Outcome: Progressing  2024 by Manuela Villa RN  Outcome: Progressing  Intervention: Promote Effective Oral Intake  Recent Flowsheet Documentation  Taken 2024 2100 by Manuela Villa RN  Feeding Interventions: sucking promoted  Taken 2024 1800 by Manuela Villa RN  Feeding Interventions: sucking  promoted  Goal: Optimal Level of Comfort and Activity  2024 2259 by Manuela Villa RN  Outcome: Progressing  2024 2258 by Manuela Villa RN  Outcome: Progressing  Goal: Effective Oxygenation and Ventilation  2024 2259 by Manuela Villa RN  Outcome: Progressing  2024 2258 by Manuela Villa RN  Outcome: Progressing  Intervention: Optimize Oxygenation and Ventilation  Recent Flowsheet Documentation  Taken 2024 1800 by Manuela Villa RN  Airway/Ventilation Management: airway patency maintained  Goal: Skin Health and Integrity  Outcome: Progressing  Intervention: Provide Skin Care and Monitor for Injury  Recent Flowsheet Documentation  Taken 2024 2100 by Manuela Villa RN  Skin Protection: adhesive use limited  Pressure Reduction Techniques: tubing/devices free from infant  Taken 2024 1800 by Manuela Villa RN  Skin Protection:   adhesive use limited   pulse oximeter probe site changed  Pressure Reduction Techniques: tubing/devices free from infant  Goal: Temperature Stability  Outcome: Progressing  Intervention: Promote Temperature Stability  Recent Flowsheet Documentation  Taken 2024 2100 by Manuela Villa RN  Warming Method:   swaddled   t-shirt  Taken 2024 1800 by Manuela Villa RN  Warming Method:   swaddled   t-shirt     Problem: Enteral Nutrition  Goal: Feeding Tolerance  2024 2259 by Manuela Villa RN  Outcome: Progressing  2024 2258 by Manuela Villa RN  Outcome: Progressing        [TextEntry] : General: NAD Psych:  flat affect, no SI/HI, good thought process and judgement Neuro: [id number]

## 2024-09-25 NOTE — HISTORY OF PRESENT ILLNESS
[FreeTextEntry1] :  Follow Up Visit [de-identified] : SHAYNE KENT is a 24 year M who comes in for a follow up visit. Patient states his psychiatrist retired and they are in office no longer takes insurance and therefore he did not receive refill of his Lexapro and has been out of medication for the past 2 months.  He did try other psychiatrist but some did not take his insurance and others required therapy sessions 3 times a week and patient states he is unable to do so due to work restrictions.  Patient states that he does have exacerbation of some of his anxiety symptoms as well as increased irritability and would like to go back on medication and find a new psychiatrist.  He is not currently in therapy. No SI/HI

## 2024-10-28 DIAGNOSIS — M25.362 OTHER INSTABILITY, LEFT KNEE: ICD-10-CM

## 2025-01-08 ENCOUNTER — APPOINTMENT (OUTPATIENT)
Dept: ORTHOPEDIC SURGERY | Facility: CLINIC | Age: 25
End: 2025-01-08

## 2025-01-08 DIAGNOSIS — M25.362 OTHER INSTABILITY, LEFT KNEE: ICD-10-CM

## 2025-01-08 DIAGNOSIS — M17.12 UNILATERAL PRIMARY OSTEOARTHRITIS, LEFT KNEE: ICD-10-CM

## 2025-01-08 PROCEDURE — 99213 OFFICE O/P EST LOW 20 MIN: CPT | Mod: 25

## 2025-01-08 PROCEDURE — 20610 DRAIN/INJ JOINT/BURSA W/O US: CPT | Mod: LT

## 2025-01-10 VITALS — WEIGHT: 208 LBS | BODY MASS INDEX: 29.12 KG/M2 | HEIGHT: 71 IN

## 2025-01-10 PROBLEM — M17.12 PRIMARY OSTEOARTHRITIS OF LEFT KNEE: Status: ACTIVE | Noted: 2025-01-08

## 2025-03-25 ENCOUNTER — APPOINTMENT (OUTPATIENT)
Dept: INTERNAL MEDICINE | Facility: CLINIC | Age: 25
End: 2025-03-25
Payer: COMMERCIAL

## 2025-03-25 VITALS
HEART RATE: 70 BPM | BODY MASS INDEX: 29.54 KG/M2 | DIASTOLIC BLOOD PRESSURE: 70 MMHG | SYSTOLIC BLOOD PRESSURE: 120 MMHG | OXYGEN SATURATION: 98 % | TEMPERATURE: 97.9 F | WEIGHT: 211 LBS | HEIGHT: 71 IN

## 2025-03-25 DIAGNOSIS — F41.9 ANXIETY DISORDER, UNSPECIFIED: ICD-10-CM

## 2025-03-25 DIAGNOSIS — Z00.00 ENCOUNTER FOR GENERAL ADULT MEDICAL EXAMINATION W/OUT ABNORMAL FINDINGS: ICD-10-CM

## 2025-03-25 PROCEDURE — 99395 PREV VISIT EST AGE 18-39: CPT

## 2025-03-31 ENCOUNTER — TRANSCRIPTION ENCOUNTER (OUTPATIENT)
Age: 25
End: 2025-03-31

## 2025-03-31 LAB
ALBUMIN SERPL ELPH-MCNC: 5.1 G/DL
ALP BLD-CCNC: 79 U/L
ALT SERPL-CCNC: 25 U/L
ANION GAP SERPL CALC-SCNC: 12 MMOL/L
AST SERPL-CCNC: 21 U/L
BASOPHILS # BLD AUTO: 0.05 K/UL
BASOPHILS NFR BLD AUTO: 0.9 %
BILIRUB SERPL-MCNC: 0.5 MG/DL
BUN SERPL-MCNC: 12 MG/DL
CALCIUM SERPL-MCNC: 10.1 MG/DL
CHLORIDE SERPL-SCNC: 102 MMOL/L
CHOLEST SERPL-MCNC: 136 MG/DL
CO2 SERPL-SCNC: 25 MMOL/L
CREAT SERPL-MCNC: 0.84 MG/DL
EGFRCR SERPLBLD CKD-EPI 2021: 124 ML/MIN/1.73M2
EOSINOPHIL # BLD AUTO: 0.07 K/UL
EOSINOPHIL NFR BLD AUTO: 1.2 %
GLUCOSE SERPL-MCNC: 105 MG/DL
HCT VFR BLD CALC: 43.8 %
HDLC SERPL-MCNC: 65 MG/DL
HGB BLD-MCNC: 14.8 G/DL
IMM GRANULOCYTES NFR BLD AUTO: 0.5 %
LDLC SERPL-MCNC: 59 MG/DL
LYMPHOCYTES # BLD AUTO: 2.06 K/UL
LYMPHOCYTES NFR BLD AUTO: 36.1 %
MAN DIFF?: NORMAL
MCHC RBC-ENTMCNC: 28.3 PG
MCHC RBC-ENTMCNC: 33.8 G/DL
MCV RBC AUTO: 83.7 FL
MONOCYTES # BLD AUTO: 0.68 K/UL
MONOCYTES NFR BLD AUTO: 11.9 %
NEUTROPHILS # BLD AUTO: 2.81 K/UL
NEUTROPHILS NFR BLD AUTO: 49.4 %
NONHDLC SERPL-MCNC: 71 MG/DL
PLATELET # BLD AUTO: 390 K/UL
POTASSIUM SERPL-SCNC: 4.5 MMOL/L
PROT SERPL-MCNC: 7.5 G/DL
RBC # BLD: 5.23 M/UL
RBC # FLD: 13.3 %
SODIUM SERPL-SCNC: 139 MMOL/L
TRIGL SERPL-MCNC: 50 MG/DL
WBC # FLD AUTO: 5.7 K/UL
